# Patient Record
Sex: MALE | Race: WHITE | NOT HISPANIC OR LATINO | URBAN - METROPOLITAN AREA
[De-identification: names, ages, dates, MRNs, and addresses within clinical notes are randomized per-mention and may not be internally consistent; named-entity substitution may affect disease eponyms.]

---

## 2022-07-11 ENCOUNTER — EMERGENCY (EMERGENCY)
Facility: HOSPITAL | Age: 57
LOS: 0 days | Discharge: HOME | End: 2022-07-11
Attending: EMERGENCY MEDICINE | Admitting: EMERGENCY MEDICINE

## 2022-07-11 VITALS
HEART RATE: 64 BPM | RESPIRATION RATE: 16 BRPM | DIASTOLIC BLOOD PRESSURE: 68 MMHG | OXYGEN SATURATION: 98 % | TEMPERATURE: 99 F | WEIGHT: 149.91 LBS | SYSTOLIC BLOOD PRESSURE: 120 MMHG

## 2022-07-11 VITALS
RESPIRATION RATE: 17 BRPM | TEMPERATURE: 97 F | HEART RATE: 82 BPM | SYSTOLIC BLOOD PRESSURE: 117 MMHG | DIASTOLIC BLOOD PRESSURE: 55 MMHG | OXYGEN SATURATION: 97 %

## 2022-07-11 DIAGNOSIS — S43.005A UNSPECIFIED DISLOCATION OF LEFT SHOULDER JOINT, INITIAL ENCOUNTER: ICD-10-CM
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
## 2022-07-11 DIAGNOSIS — M25.511 PAIN IN RIGHT SHOULDER: ICD-10-CM
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
## 2022-07-11 DIAGNOSIS — I10 ESSENTIAL (PRIMARY) HYPERTENSION: ICD-10-CM

## 2022-07-11 PROCEDURE — 71045 X-RAY EXAM CHEST 1 VIEW: CPT | Mod: 26

## 2022-07-11 PROCEDURE — 23650 CLTX SHO DSLC W/MNPJ WO ANES: CPT | Mod: 54,LT

## 2022-07-11 PROCEDURE — 99284 EMERGENCY DEPT VISIT MOD MDM: CPT | Mod: 57

## 2022-07-11 PROCEDURE — 72170 X-RAY EXAM OF PELVIS: CPT | Mod: 26

## 2022-07-11 PROCEDURE — 73030 X-RAY EXAM OF SHOULDER: CPT | Mod: 26,LT,77

## 2022-07-11 PROCEDURE — 73030 X-RAY EXAM OF SHOULDER: CPT | Mod: 26,LT

## 2022-07-11 PROCEDURE — 73060 X-RAY EXAM OF HUMERUS: CPT | Mod: 26,LT

## 2022-07-11 RX ORDER — TETANUS TOXOID, REDUCED DIPHTHERIA TOXOID AND ACELLULAR PERTUSSIS VACCINE, ADSORBED 5; 2.5; 8; 8; 2.5 [IU]/.5ML; [IU]/.5ML; UG/.5ML; UG/.5ML; UG/.5ML
0.5 SUSPENSION INTRAMUSCULAR ONCE
Refills: 0 | Status: COMPLETED | OUTPATIENT
Start: 2022-07-11 | End: 2022-07-11

## 2022-07-11 RX ORDER — IBUPROFEN 200 MG
800 TABLET ORAL ONCE
Refills: 0 | Status: COMPLETED | OUTPATIENT
Start: 2022-07-11 | End: 2022-07-11

## 2022-07-11 RX ADMIN — Medication 800 MILLIGRAM(S): at 12:45

## 2022-07-11 RX ADMIN — TETANUS TOXOID, REDUCED DIPHTHERIA TOXOID AND ACELLULAR PERTUSSIS VACCINE, ADSORBED 0.5 MILLILITER(S): 5; 2.5; 8; 8; 2.5 SUSPENSION INTRAMUSCULAR at 12:45

## 2022-07-11 NOTE — ED ADULT NURSE NOTE - CHIEF COMPLAINT QUOTE
ACP Pt. fell from ladder 14 feet. C/o left arm pain. Abrasion noted to left forearm and B/L knees. Denies hitting head, LOC, or blood thinners.

## 2022-07-11 NOTE — ED PROVIDER NOTE - ATTENDING CONTRIBUTION TO CARE
57-year-old male not on blood thinners presents status post fall.  Patient fell approximately 14 feet when he was on a ladder.  The ladder tipped over and patient fell on left shoulder.  Patient was able to ambulate after the fall.  Patient ambulated to the ED.  No head injury.  No LOC.  No neck pain.  No abdominal pain.  No chest pain or shortness of breath.  No other complaints.  Patient only complains of left shoulder pain.  Patient is awake alert.  Patient breathing comfortably.  Abdomen soft nontender.  Patient with abrasions to both knees and right elbow.  Flexion extension intact in both knees.  Flexion extension intact in right elbow.  Patient only with left shoulder tenderness.  Patient with deformity to left shoulder.  No C-spine tenderness.  No back tenderness.  Plan: Tetanus, x-rays, shoulder reduction, reassess.  Patient does not want narcotics for pain. 57-year-old male not on blood thinners presents status post fall.  Patient fell approximately 14 feet when he was on a ladder.  The ladder tipped over and patient fell on left shoulder.  Patient was able to ambulate after the fall.  Patient ambulated to the ED.  No head injury.  No LOC.  No neck pain.  No abdominal pain.  No chest pain or shortness of breath.  No other complaints.  Patient only complains of left shoulder pain.  Patient is awake alert.  Patient breathing comfortably.  Abdomen soft nontender.  Patient with abrasions to both knees and right elbow.  Flexion extension intact in both knees.  Flexion extension intact in right elbow.  Patient only with left shoulder tenderness.  Patient with deformity to left shoulder.  No C-spine tenderness.  No back tenderness.  Motor/sensation intact in left hand, sensation intact over left deltoid, 2+ radial pulse in left hand.  Plan: Tetanus, x-rays, shoulder reduction, reassess.  Patient does not want narcotics for pain.

## 2022-07-11 NOTE — ED PROVIDER NOTE - PHYSICAL EXAMINATION
CONST: well appearing for age  HEAD:  normocephalic, atraumatic, no palacios sign  EYES:  PERRLA, conjunctivae without injection, drainage or discharge, no raccoon eyes  ENMT: nasal mucosa moist; mouth moist without ulcerations or lesions; throat moist without erythema, exudate, ulcerations or lesions  NECK:  supple, full ROM, no midline tenderness  CARDIAC:  regular rate and rhythm, normal S1 and S2, no murmurs, rubs or gallops  RESP:  respiratory rate and effort appear normal for age; lungs are clear to auscultation bilaterally; no rales or wheezes  BACK: no midline tenderness, stepoff or deformity  ABDOMEN:  soft, nontender, nondistended  EXTREMITIES: + left shoulder deformity with limited ROM, able to make a fist with his left hand, full ROM of left wrist and elbow, +2 radial pulses bilaterally  NEURO: CN II-XII grossly intact, sensation intact throughout, normal movement, normal tone  SKIN:  + ecchymoses to left arm, normal skin color for age and race, well-perfused; warm and dry

## 2022-07-11 NOTE — ED ADULT NURSE NOTE - OBJECTIVE STATEMENT
Pt. fell from ladder 14 feet. C/o left arm pain. Abrasion noted to left forearm and B/L knees. Denies hitting head, LOC, or blood thinners.

## 2022-07-11 NOTE — ED PROVIDER NOTE - PATIENT PORTAL LINK FT
You can access the FollowMyHealth Patient Portal offered by [facility] by registering at the following website: [email or web address]. By joining [facility]’s FollowMyHealth portal, you will also be able to view your health information using other applications (apps) compatible with our system.

## 2022-07-11 NOTE — ED PROCEDURE NOTE - NS_EDPROVIDERDISPOUSERTYPE_ED_A_ED
From: Karishma Will  To: Conor Cook MD  Sent: 7/1/2020 4:08 PM CDT  Subject: Upcoming Appointment    I would like to schedule the appointment for Monday at 10:30.   Im trying to call back the number but it says is not a valod number  
Message received from Pt via [employer]. Pt instructed to contact clinic,phone number provided,to schedule requested appt.  Pt's information given to COA to assist.  
Attending Attestation (For Attendings USE Only)...

## 2022-07-11 NOTE — ED PROVIDER NOTE - WR ORDER NAME 5
Hx of falls, likely mechanical given blindness and severely edematous legs. Xray Shoulder 2 Views, Left Hx of falls, likely mechanical given blindness and severely edematous legs vs. cardiac. EKG showing NSR, 1st heart block, TWI. Unlikely neurological given no LOC.  - trops  - fall precautions  - PT consult  - consider vascular consult in AM for severely edematous legs and hx of venous insufficiency/PAD Hx of falls, likely mechanical given blindness and severely edematous legs vs. cardiac. EKG showing NSR, 1st heart block, TWI. Unlikely neurological given no LOC.  - f/u cardiac enzymes  - fall precautions  - PT consult  - consider vascular consult in AM for severely edematous legs and hx of venous insufficiency/PAD Hx of falls, likely mechanical given blindness and severely edematous legs vs. cardiac. EKG showing NSR, 1st heart block, TWI II/III/AVF/V2-V6. Unlikely neurological given no LOC, mental status at baseline.  - f/u cardiac enzymes  - fall precautions  - PT consult  - consider vascular consult in AM for severely edematous legs and hx of venous insufficiency/PAD

## 2022-07-11 NOTE — ED PROVIDER NOTE - PROGRESS NOTE DETAILS
PS: Shoulder reduced successfully. Sling placed. Pt to follow up with ortho Patient examined status post reduction.  X-ray reveals shoulder was reduced successfully.  Sensation intact over deltoid.  Radial pulse intact.  Motor sensation intact in the hand.  No numbness.  Patient to follow-up with Ortho as outpatient.  I spoke to family at length and informed them of importance of follow-up.

## 2022-07-11 NOTE — ED PROVIDER NOTE - OBJECTIVE STATEMENT
Pt is a 58 y/o male with PMH of HTN presenting for left shoulder pain. Pt was standing on a ladder 14 feet high when he accidentally fell and landed on his left side on the grass. Denies head trauma, LOC or blood thinners. Fall was witnessed by pt's friend and pt was able to ambulate after fall. Only reports left shoulder pain.

## 2022-07-11 NOTE — ED PROVIDER NOTE - WR ORDER NAME 1
Detail Level: Simple Render Post-Care Instructions In Note?: no Consent: The patient's consent was obtained including but not limited to risks of crusting, scabbing, blistering, scarring, darker or lighter pigmentary change, recurrence, incomplete removal and infection. Duration Of Freeze Thaw-Cycle (Seconds): 0 Post-Care Instructions: I reviewed with the patient in detail post-care instructions. Patient is to wear sunprotection, and avoid picking at any of the treated lesions. Pt may apply Vaseline to crusted or scabbing areas. Xray Humerus, Left

## 2022-07-11 NOTE — ED PROVIDER NOTE - CLINICAL SUMMARY MEDICAL DECISION MAKING FREE TEXT BOX
Patient here status post fall when ladder tipped over.  Patient landed on left shoulder.  Patient was able to ambulate after the fall.  Patient is not on blood thinners.  No head injury no LOC no neck pain no back pain no abdominal pain no chest pain no shortness of breath.  Patient only with left shoulder pain.  On exam patient with left elbow deformity and pain.  Patient with abrasions to both knees and right elbow.  No pain to either knee or right elbow.  No C-spine tenderness.  No back tenderness.  No bruising to chest or back or abdomen.  Abdomen soft nontender.  Patient with left shoulder dislocation.  No fracture seen on x-ray.  Shoulder was successfully reduced.  Patient only wanted ibuprofen.  Patient placed in sling status post reduction.  Patient and family informed of importance of follow-up with orthopedics.  Patient neurovascularly intact status post reduction.  Sensation intact over deltoid.  Family members  for patient.  Patient also given tetanus.

## 2022-07-11 NOTE — ED PROVIDER NOTE - CARE PROVIDER_API CALL
Avi Bustillo)  Orthopaedic Surgery  [address] [place] [address]  Phone: [phone]  Fax: [phone]  Follow Up Time: 1-3 Days

## 2022-07-14 ENCOUNTER — APPOINTMENT (OUTPATIENT)
Dept: ORTHOPEDIC SURGERY | Facility: CLINIC | Age: 57
End: 2022-07-14

## 2022-07-14 DIAGNOSIS — Z78.9 OTHER SPECIFIED HEALTH STATUS: ICD-10-CM

## 2022-07-14 DIAGNOSIS — S43.014A ANTERIOR DISLOCATION OF RIGHT HUMERUS, INITIAL ENCOUNTER: ICD-10-CM

## 2022-07-14 PROBLEM — Z00.00 ENCOUNTER FOR PREVENTIVE HEALTH EXAMINATION: Status: ACTIVE | Noted: 2022-07-14

## 2022-07-14 PROCEDURE — 99203 OFFICE O/P NEW LOW 30 MIN: CPT

## 2022-07-14 RX ORDER — TIZANIDINE 4 MG/1
4 TABLET ORAL
Qty: 30 | Refills: 0 | Status: ACTIVE | COMMUNITY
Start: 2022-07-14 | End: 1900-01-01

## 2022-07-20 NOTE — HISTORY OF PRESENT ILLNESS
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

## 2022-07-20 NOTE — DISCUSSION/SUMMARY
[de-identified] :   At this time my impression is status post anterior dislocation of the right shoulder.\par Patient was advised for the use of a sling at all times.\par Patient was advised to use a for least 2 weeks.\par After that we may start with pendulum exercises.\par Patient was advised for an MRI of the left shoulder to rule out any internal derangement and possible proximal biceps tendon tear.\par Patient was advised to continue with ibuprofen for pain, prescription for a muscle relaxant would be sensing he has spasm on his left upper extremity.\par Patient was advised for range of motion of the elbow arm wrist.\par \par Follow-up appointment 2 weeks with our sports team.\par \par Supervising Physician Dr. Mirtha Castorena.\par

## 2022-07-20 NOTE — IMAGING
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

## 2022-07-25 ENCOUNTER — APPOINTMENT (OUTPATIENT)
Dept: ORTHOPEDIC SURGERY | Facility: CLINIC | Age: 57
End: 2022-07-25

## 2022-08-16 DIAGNOSIS — Z87.39 PERSONAL HISTORY OF OTHER DISEASES OF THE MUSCULOSKELETAL SYSTEM AND CONNECTIVE TISSUE: ICD-10-CM

## 2022-08-16 RX ORDER — MELOXICAM 15 MG/1
15 TABLET ORAL DAILY
Qty: 30 | Refills: 1 | Status: ACTIVE | COMMUNITY
Start: 2022-08-16 | End: 1900-01-01

## 2022-09-07 ENCOUNTER — APPOINTMENT (OUTPATIENT)
Dept: ORTHOPEDIC SURGERY | Facility: CLINIC | Age: 57
End: 2022-09-07

## 2022-09-07 PROCEDURE — 73560 X-RAY EXAM OF KNEE 1 OR 2: CPT | Mod: 50

## 2022-09-07 PROCEDURE — 99214 OFFICE O/P EST MOD 30 MIN: CPT

## 2022-09-07 PROCEDURE — 73030 X-RAY EXAM OF SHOULDER: CPT | Mod: LT

## 2022-09-07 PROCEDURE — 73070 X-RAY EXAM OF ELBOW: CPT | Mod: RT

## 2022-09-07 RX ORDER — NABUMETONE 750 MG/1
750 TABLET, FILM COATED ORAL DAILY
Qty: 60 | Refills: 0 | Status: ACTIVE | COMMUNITY
Start: 2022-09-07 | End: 1900-01-01

## 2022-09-23 ENCOUNTER — APPOINTMENT (OUTPATIENT)
Dept: ORTHOPEDIC SURGERY | Facility: CLINIC | Age: 57
End: 2022-09-23

## 2022-10-13 NOTE — HISTORY OF PRESENT ILLNESS
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 not June, was uneven surface and he fell 14 ft dislocating his left shoulder x-rays confirmed that for from that x-rays taken at the hospital and was reduced to that day due to severe pain it not notice all the other initial injuries\par \par  right elbow has good range of motion no ecchymosis or abrasions pain over the olecranon on, sensitive palpation on the skin over the olecranon\par  x-rays right elbow show no fractures no soft tissue calcifications\par  right elbow contusion\par \par  left and right knees have pain anteriorly mildly medially ligaments are stable negative Homans sign\par \par  x-rays of left and right knee show no fractures no soft tissue calcifications n\par o arthritis\par  impression is bilateral knee contusions recommend therapy and anti-inflammatories\par \par  left shoulder has guarded range of motion is about 6 weeks from his shoulder dislocation MRI with partial cuff tear, recommend physical therapy DC the sling\par \par  he is unable to work 100% temporarily disabled will see him back in 6 week intervals may need further imaging studies on his knees if he fails to improve\par \par Left groin pain he has pain with coughing, no pain with range of motion of his hip, recommend he see a general surgeon to rule out a hernia\par  if the history is as stated is true and there is a definite cause agile relationship between his fall and the above injuries.\par Instructions: NSAIDS\par Patient is to begin over the counter oral anti-inflammatory medications on an as needed basis, as long as there are no contra-indications.  Patient is counseled on possible GI and blood pressure side effects.\par

## 2022-10-18 ENCOUNTER — APPOINTMENT (OUTPATIENT)
Dept: SURGERY | Facility: CLINIC | Age: 57
End: 2022-10-18

## 2022-10-18 VITALS — HEIGHT: 67 IN | BODY MASS INDEX: 25.9 KG/M2 | WEIGHT: 165 LBS

## 2022-10-18 DIAGNOSIS — F17.200 NICOTINE DEPENDENCE, UNSPECIFIED, UNCOMPLICATED: ICD-10-CM

## 2022-10-18 PROCEDURE — 99072 ADDL SUPL MATRL&STAF TM PHE: CPT

## 2022-10-18 PROCEDURE — 99243 OFF/OP CNSLTJ NEW/EST LOW 30: CPT

## 2022-10-18 NOTE — CONSULT LETTER
[Dear  ___] : Dear  [unfilled], [Courtesy Letter:] : I had the pleasure of seeing your patient, [unfilled], in my office today. [Please see my note below.] : Please see my note below. [Consult Closing:] : Thank you very much for allowing me to participate in the care of this patient.  If you have any questions, please do not hesitate to contact me. [DrShannan  ___] : Dr. GRIMALDO [FreeTextEntry3] : Respectfully,\par \par Wayne Shrestha M.D., FACS\par

## 2022-10-18 NOTE — PHYSICAL EXAM
[Normal Breath Sounds] : Normal breath sounds [No Rash or Lesion] : No rash or lesion [Alert] : alert [Calm] : calm [JVD] : no jugular venous distention  [de-identified] : Healthy [de-identified] : Normal [de-identified] : Soft and flat abdomen [de-identified] : Normal testicles [de-identified] : Large incarcerated left inguinal scrotal hernia

## 2022-10-18 NOTE — ASSESSMENT
[FreeTextEntry1] : Reji is a pleasant 57-year-old  Sinhala  presenting to the office with his daughter (who acted as  at his request) with a past medical history significant for well-controlled hypertension and recent cigarette smoking (3 to 5 cigarettes/day) who fell 14 feet at work on July 11, 2022 sustaining a significant left shoulder injury currently under the care of orthopedic surgery and who began experiencing pain and swelling in the left groin shortly after his fall prompting a visit to his primary care physician who diagnosed him with a left inguinal hernia now requiring surgical evaluation.  He did have a right inguinal hernia repair by another surgeon in 2017.\par \par Physical examination demonstrates a large tender potato sized bulge in the left groin which extends deep into the left hemiscrotum which is not reducible consistent with an incarcerated left inguinal scrotal hernia warranting surgical repair.  There is no evidence of strangulation and the patient denies any symptoms of obstruction.  Examination of his right groin demonstrates a well-healed scar with no evidence of hernia recurrence or delayed wound complications.  Both testicles are normal.  His umbilical examination is unremarkable.  His current BMI is 26.  He is currently wearing a left shoulder sling.\par \par I explained the pros and cons of surgery, as well as all risks, benefits, indications and alternatives of the procedure and the patient and his daughter understood and agreed.  Reji was scheduled for the repair of his incarcerated left inguinal scrotal hernia with mesh on Wednesday, November 23, 2022 under LOCAL with IV SEDATION at [facility] with presurgical testing waived.  He was encouraged to avoid heavy lifting and strenuous activity in the interim, of course.\par \par After our discussion, he is aware of the dangers of cigarette smoking, especially with regard to wound healing, wound complications, hernia development and hernia recurrence.  He was encouraged to quit or minimize smoking in the perioperative period and has agreed to try.\par

## 2022-10-31 ENCOUNTER — APPOINTMENT (OUTPATIENT)
Dept: ORTHOPEDIC SURGERY | Facility: CLINIC | Age: 57
End: 2022-10-31

## 2022-10-31 DIAGNOSIS — M75.00 ADHESIVE CAPSULITIS OF UNSPECIFIED SHOULDER: ICD-10-CM

## 2022-10-31 PROCEDURE — 99214 OFFICE O/P EST MOD 30 MIN: CPT

## 2022-10-31 NOTE — HISTORY OF PRESENT ILLNESS
[de-identified] : Patient is here for evaluation about 4 months from his left shoulder dislocation MRI with partial cuff tear labral tear but also his stated had thickening of the anterior-inferior glenohumeral ligament he has a new onset diabetic comes in with his daughter today comes in with a sling on\par \par  he has got very guarded range of motion about the 70¦ forward flexion 60° abduction is got no erythema adjust the pain and and very guarded normal sensation light touch over the deltoid good elbow and wrist range of motion as well as digit range of motion Mc and palmar flexion\par \par  developing early frozen shoulder status post dislocation recommend he DC the sling I did recommend that from his last visit as well and reinforce that a.m. recommend anti-inflammatories side effects discussed will change it since he had minimal or relief with his last anti-inflammatory also offered them an injection he want to hold off and so on his next visit will we offer the injection repeat the physical exam and told him there in the plateau phase of this frozen shoulder, I also told him again that the shoulder injection would help with the pain but they want to hold off on that injection will see him back in 6 weeks status post dislocation and now developing frozen shoulder

## 2022-11-20 ENCOUNTER — LABORATORY RESULT (OUTPATIENT)
Age: 57
End: 2022-11-20

## 2022-11-22 NOTE — ASU PATIENT PROFILE, ADULT - FALL HARM RISK - UNIVERSAL INTERVENTIONS
Bed in lowest position, wheels locked, appropriate side rails in place/Call bell, personal items and telephone in reach/Instruct patient to call for assistance before getting out of bed or chair/Non-slip footwear when patient is out of bed/[place] to call system/Physically safe environment - no spills, clutter or unnecessary equipment/Purposeful Proactive Rounding/Room/bathroom lighting operational, light cord in reach

## 2022-11-22 NOTE — ASU PATIENT PROFILE, ADULT - NSICDXPASTMEDICALHX_GEN_ALL_CORE_FT
PAST MEDICAL HISTORY:  Closed dislocation of other site left shoulder dislocation    Hypertension

## 2022-11-23 ENCOUNTER — TRANSCRIPTION ENCOUNTER (OUTPATIENT)
Age: 57
End: 2022-11-23

## 2022-11-23 ENCOUNTER — OUTPATIENT (OUTPATIENT)
Dept: OUTPATIENT SERVICES | Facility: HOSPITAL | Age: 57
LOS: 1 days | Discharge: HOME | End: 2022-11-23

## 2022-11-23 ENCOUNTER — APPOINTMENT (OUTPATIENT)
Dept: SURGERY | Facility: AMBULATORY SURGERY CENTER | Age: 57
End: 2022-11-23

## 2022-11-23 VITALS — SYSTOLIC BLOOD PRESSURE: 111 MMHG | DIASTOLIC BLOOD PRESSURE: 65 MMHG | HEART RATE: 62 BPM | RESPIRATION RATE: 22 BRPM

## 2022-11-23 VITALS
TEMPERATURE: 98 F | RESPIRATION RATE: 18 BRPM | DIASTOLIC BLOOD PRESSURE: 65 MMHG | OXYGEN SATURATION: 99 % | WEIGHT: 164.91 LBS | HEART RATE: 66 BPM | SYSTOLIC BLOOD PRESSURE: 130 MMHG | HEIGHT: 67.32 IN

## 2022-11-23 DIAGNOSIS — Z98.890 OTHER SPECIFIED POSTPROCEDURAL STATES: Chronic | ICD-10-CM

## 2022-11-23 PROCEDURE — 49507 PRP I/HERN INIT BLOCK >5 YR: CPT | Mod: LT

## 2022-11-23 RX ORDER — HYDROMORPHONE HYDROCHLORIDE 2 MG/ML
0.5 INJECTION INTRAMUSCULAR; INTRAVENOUS; SUBCUTANEOUS
Refills: 0 | Status: DISCONTINUED | OUTPATIENT
Start: 2022-11-23 | End: 2022-11-23

## 2022-11-23 RX ORDER — TRAMADOL HYDROCHLORIDE 50 MG/1
1 TABLET ORAL
Qty: 24 | Refills: 0
Start: 2022-11-23 | End: 2022-11-26

## 2022-11-23 RX ORDER — OXAPROZIN 600 MG/1
2 TABLET, FILM COATED ORAL
Qty: 0 | Refills: 0 | DISCHARGE

## 2022-11-23 RX ORDER — ATENOLOL 25 MG/1
0.5 TABLET ORAL
Qty: 0 | Refills: 0 | DISCHARGE

## 2022-11-23 RX ORDER — SODIUM CHLORIDE 9 MG/ML
1000 INJECTION, SOLUTION INTRAVENOUS
Refills: 0 | Status: DISCONTINUED | OUTPATIENT
Start: 2022-11-23 | End: 2022-12-07

## 2022-11-23 RX ORDER — ACETAMINOPHEN 500 MG
650 TABLET ORAL ONCE
Refills: 0 | Status: DISCONTINUED | OUTPATIENT
Start: 2022-11-23 | End: 2022-12-07

## 2022-11-23 RX ADMIN — SODIUM CHLORIDE 100 MILLILITER(S): 9 INJECTION, SOLUTION INTRAVENOUS at 12:42

## 2022-11-23 NOTE — BRIEF OPERATIVE NOTE - NSICDXBRIEFPROCEDURE_GEN_ALL_CORE_FT
PROCEDURES:  Open repair of incarcerated inguinal hernia using mesh in adult 23-Nov-2022 11:29:27 LEFT Wayne Shrestha

## 2022-11-23 NOTE — ASU DISCHARGE PLAN (ADULT/PEDIATRIC) - CARE PROVIDER_API CALL
Wayne Shrestha)  Surgery  [address]  [place] [address]  Phone: [phone]  Fax: [phone]  Scheduled Appointment: 11/30/2022

## 2022-11-23 NOTE — ASU DISCHARGE PLAN (ADULT/PEDIATRIC) - NS MD DC FALL RISK RISK
For information on Fall & Injury Prevention, visit: [email or web address] OR  [email or web address] OR  https://www.cdc.gov/steadi/patient.html

## 2022-11-23 NOTE — CHART NOTE - NSCHARTNOTEFT_GEN_A_CORE
PACU ANESTHESIA ADMISSION NOTE      Procedure: Open repair of incarcerated inguinal hernia using mesh in adult  LEFT      Post op diagnosis:  Incarcerated left inguinal hernia        ____  Intubated  TV:______       Rate: ______      FiO2: ______    _x___  Patent Airway    _x___  Full return of protective reflexes    x____  Full recovery from anesthesia / back to baseline     Vitals:   T:   36.2        R:  11                BP:  112/74                Sat:    96               P: 71      Mental Status:  _x___ Awake   __x___ Alert   _____ Drowsy   _____ Sedated    Nausea/Vomiting:  __x__ NO  ______Yes,   See Post - Op Orders          Pain Scale (0-10):  ___x__    Treatment: ____ None    ____ See Post - Op/PCA Orders    Post - Operative Fluids:   __x__ Oral   ____ See Post - Op Orders    Plan: Discharge:   x____Home       _____Floor     _____Critical Care    _____  Other:_________________

## 2022-11-23 NOTE — ASU DISCHARGE PLAN (ADULT/PEDIATRIC) - D. IF YOU HAD ANY TYPE OF SEDATION, YOU MAY EXPERIENCE LIGHTHEADEDNESS, DIZZINESS, OR SLEEPINESS FOLLOWING YOUR PROCEDURE. A RESPONSIBLE ADULT SHOULD STAY WITH YOU FOR AT LEAST 24 HOURS FOLLOWING YOUR PROCEDURE.
Statement Selected Adequate: hears normal conversation without difficulty Dapsone Pregnancy And Lactation Text: This medication is Pregnancy Category C and is not considered safe during pregnancy or breast feeding.

## 2022-11-24 ENCOUNTER — FORM ENCOUNTER (OUTPATIENT)
Age: 57
End: 2022-11-24

## 2022-11-29 DIAGNOSIS — K40.30 UNILATERAL INGUINAL HERNIA, WITH OBSTRUCTION, WITHOUT GANGRENE, NOT SPECIFIED AS RECURRENT: ICD-10-CM

## 2022-11-29 DIAGNOSIS — I10 ESSENTIAL (PRIMARY) HYPERTENSION: ICD-10-CM

## 2022-11-29 DIAGNOSIS — F17.210 NICOTINE DEPENDENCE, CIGARETTES, UNCOMPLICATED: ICD-10-CM

## 2022-12-08 ENCOUNTER — APPOINTMENT (OUTPATIENT)
Dept: SURGERY | Facility: CLINIC | Age: 57
End: 2022-12-08

## 2022-12-08 DIAGNOSIS — K40.30 UNILATERAL INGUINAL HERNIA, WITH OBSTRUCTION, W/OUT GANGRENE, NOT SPECIFIED AS RECURRENT: ICD-10-CM

## 2022-12-08 PROCEDURE — 99024 POSTOP FOLLOW-UP VISIT: CPT

## 2022-12-08 NOTE — CONSULT LETTER
[Dear  ___] : Dear  [unfilled], [Courtesy Letter:] : I had the pleasure of seeing your patient, [unfilled], in my office today. [Please see my note below.] : Please see my note below. [Consult Closing:] : Thank you very much for allowing me to participate in the care of this patient.  If you have any questions, please do not hesitate to contact me. [FreeTextEntry3] : \par Sincerely,\par \par Glenny Bell PA-C, LEONIDAS\par  [DrShannan  ___] : Dr. GRIMALDO

## 2022-12-08 NOTE — ASSESSMENT
[FreeTextEntry1] : MARY KIM underwent a very large left inguinal scrotal hernia repair with mesh with Dr. Shrestha on 11/23/22  under local IV sedation without any problems or complications. His wound is clean, dry and intact. There is no evidence of erythema, seroma formation or infection. He is tolerating a diet and having normal bowel movements. He denies any significant postoperative pain or discomfort at this time.\par \par He was counseled and reassured. MARY was discharged from the office with no specific follow up necessary, but he knows to avoid any heavy lifting or strenuous activity for the next several weeks. \par \par \par \par

## 2022-12-14 ENCOUNTER — APPOINTMENT (OUTPATIENT)
Dept: ORTHOPEDIC SURGERY | Facility: CLINIC | Age: 57
End: 2022-12-14
Payer: MEDICAID

## 2022-12-14 DIAGNOSIS — S43.015D ANTERIOR DISLOCATION OF LEFT HUMERUS, SUBSEQUENT ENCOUNTER: ICD-10-CM

## 2022-12-14 DIAGNOSIS — M75.02 ADHESIVE CAPSULITIS OF LEFT SHOULDER: ICD-10-CM

## 2022-12-14 PROBLEM — T14.8XXA OTHER INJURY OF UNSPECIFIED BODY REGION, INITIAL ENCOUNTER: Chronic | Status: ACTIVE | Noted: 2022-11-23

## 2022-12-14 PROBLEM — I10 ESSENTIAL (PRIMARY) HYPERTENSION: Chronic | Status: ACTIVE | Noted: 2022-11-23

## 2022-12-14 PROCEDURE — 99213 OFFICE O/P EST LOW 20 MIN: CPT

## 2022-12-14 RX ORDER — OXAPROZIN 600 MG/1
600 TABLET ORAL
Qty: 60 | Refills: 0 | Status: ACTIVE | COMMUNITY
Start: 2022-10-31 | End: 1900-01-01

## 2022-12-14 NOTE — PHYSICAL EXAM
[Left] : left shoulder [] : tenderness to palpation [Minimal] : minimal [FreeTextEntry8] :   The patient was initially refusing to participate in range of motion during the examination. [TWNoteComboBox7] : active forward flexion 90 degrees [TWNoteComboBox4] : passive forward flexion 90 degrees [de-identified] : active abduction 70 degrees [TWNoteComboBox6] : internal rotation lateral hip

## 2022-12-14 NOTE — DISCUSSION/SUMMARY
[de-identified] :   At this point I informed the patient's daughter that my opinion I am not sure how he is even doing therapy since he is not tolerating my exam at all and refusing to participate in range of motion.  She states that his shoulder has in fact improved with therapy so I explained her that will take a very long time for her father to recover his range of motion.  He has a newly diagnosed diabetic but does not check her blood glucose level so we will hold off on any cortisone injections.  He will continue formal therapy, new Rx provided for that.  Rx for Orphenadrine.  He will follow up with Dr. Servin in 6 weeks for further evaluation.

## 2022-12-14 NOTE — HISTORY OF PRESENT ILLNESS
[de-identified] : The patient is a 57 year old male accompanied by his daughter here who is translating for him here for a subsequent re-evaluation of his left shoulder.  He is status post left shoulder dislocation almost 6 months ago.  He has developed a frozen shoulder.  He is having a lot of pain at night in the shoulder.  He is doing formal physical therapy.  His daughter reports slight increase with his range of motion.

## 2023-02-01 ENCOUNTER — APPOINTMENT (OUTPATIENT)
Dept: ORTHOPEDIC SURGERY | Facility: CLINIC | Age: 58
End: 2023-02-01

## 2023-03-06 ENCOUNTER — FORM ENCOUNTER (OUTPATIENT)
Age: 58
End: 2023-03-06

## 2023-03-29 ENCOUNTER — APPOINTMENT (OUTPATIENT)
Dept: ORTHOPEDIC SURGERY | Facility: CLINIC | Age: 58
End: 2023-03-29

## 2023-04-24 ENCOUNTER — FORM ENCOUNTER (OUTPATIENT)
Age: 58
End: 2023-04-24

## 2023-06-05 ENCOUNTER — FORM ENCOUNTER (OUTPATIENT)
Age: 58
End: 2023-06-05

## 2023-06-06 ENCOUNTER — FORM ENCOUNTER (OUTPATIENT)
Age: 58
End: 2023-06-06